# Patient Record
Sex: MALE | Race: BLACK OR AFRICAN AMERICAN | HISPANIC OR LATINO | Employment: FULL TIME | URBAN - METROPOLITAN AREA
[De-identification: names, ages, dates, MRNs, and addresses within clinical notes are randomized per-mention and may not be internally consistent; named-entity substitution may affect disease eponyms.]

---

## 2024-05-21 ENCOUNTER — OFFICE VISIT (OUTPATIENT)
Dept: PRIMARY CARE | Facility: CLINIC | Age: 44
End: 2024-05-21
Payer: COMMERCIAL

## 2024-05-21 ENCOUNTER — LAB (OUTPATIENT)
Dept: LAB | Facility: LAB | Age: 44
End: 2024-05-21
Payer: COMMERCIAL

## 2024-05-21 VITALS
WEIGHT: 315 LBS | BODY MASS INDEX: 40.43 KG/M2 | SYSTOLIC BLOOD PRESSURE: 174 MMHG | TEMPERATURE: 97.1 F | HEIGHT: 74 IN | DIASTOLIC BLOOD PRESSURE: 100 MMHG | HEART RATE: 84 BPM | RESPIRATION RATE: 18 BRPM | OXYGEN SATURATION: 94 %

## 2024-05-21 DIAGNOSIS — Z11.3 ROUTINE SCREENING FOR STI (SEXUALLY TRANSMITTED INFECTION): ICD-10-CM

## 2024-05-21 DIAGNOSIS — I10 PRIMARY HYPERTENSION: ICD-10-CM

## 2024-05-21 DIAGNOSIS — Z00.00 ANNUAL PHYSICAL EXAM: ICD-10-CM

## 2024-05-21 DIAGNOSIS — Z90.01 HISTORY OF EYE REMOVAL: ICD-10-CM

## 2024-05-21 DIAGNOSIS — E66.01 MORBID OBESITY WITH BODY MASS INDEX (BMI) OF 40.0 OR HIGHER (MULTI): ICD-10-CM

## 2024-05-21 DIAGNOSIS — R06.01 ORTHOPNEA: ICD-10-CM

## 2024-05-21 DIAGNOSIS — Z00.00 ANNUAL PHYSICAL EXAM: Primary | ICD-10-CM

## 2024-05-21 DIAGNOSIS — R32 URINARY INCONTINENCE, UNSPECIFIED TYPE: ICD-10-CM

## 2024-05-21 DIAGNOSIS — F17.210 CIGARETTE NICOTINE DEPENDENCE WITHOUT COMPLICATION: ICD-10-CM

## 2024-05-21 DIAGNOSIS — F41.9 ANXIETY: ICD-10-CM

## 2024-05-21 LAB
25(OH)D3 SERPL-MCNC: <7 NG/ML (ref 30–100)
ALBUMIN SERPL BCP-MCNC: 4.4 G/DL (ref 3.4–5)
ALP SERPL-CCNC: 48 U/L (ref 33–120)
ALT SERPL W P-5'-P-CCNC: 30 U/L (ref 10–52)
AMORPH CRY #/AREA UR COMP ASSIST: ABNORMAL /HPF
ANION GAP SERPL CALC-SCNC: 11 MMOL/L (ref 10–20)
APPEARANCE UR: ABNORMAL
AST SERPL W P-5'-P-CCNC: 17 U/L (ref 9–39)
BACTERIA #/AREA URNS AUTO: ABNORMAL /HPF
BILIRUB SERPL-MCNC: 0.6 MG/DL (ref 0–1.2)
BILIRUB UR STRIP.AUTO-MCNC: NEGATIVE MG/DL
BUN SERPL-MCNC: 17 MG/DL (ref 6–23)
CALCIUM SERPL-MCNC: 9 MG/DL (ref 8.6–10.3)
CHLORIDE SERPL-SCNC: 106 MMOL/L (ref 98–107)
CHOLEST SERPL-MCNC: 189 MG/DL (ref 0–199)
CHOLESTEROL/HDL RATIO: 5.1
CO2 SERPL-SCNC: 27 MMOL/L (ref 21–32)
COLOR UR: YELLOW
CREAT SERPL-MCNC: 1.05 MG/DL (ref 0.5–1.3)
CREAT UR-MCNC: 279.6 MG/DL (ref 20–370)
EGFRCR SERPLBLD CKD-EPI 2021: 90 ML/MIN/1.73M*2
ERYTHROCYTE [DISTWIDTH] IN BLOOD BY AUTOMATED COUNT: 18.1 % (ref 11.5–14.5)
EST. AVERAGE GLUCOSE BLD GHB EST-MCNC: 134 MG/DL
GLUCOSE SERPL-MCNC: 93 MG/DL (ref 74–99)
GLUCOSE UR STRIP.AUTO-MCNC: NEGATIVE MG/DL
HBA1C MFR BLD: 6.3 %
HBV SURFACE AG SERPL QL IA: NONREACTIVE
HCT VFR BLD AUTO: 50.6 % (ref 41–52)
HCV AB SER QL: NONREACTIVE
HDLC SERPL-MCNC: 37.4 MG/DL
HGB BLD-MCNC: 16.1 G/DL (ref 13.5–17.5)
HIV 1+2 AB+HIV1 P24 AG SERPL QL IA: NONREACTIVE
KETONES UR STRIP.AUTO-MCNC: NEGATIVE MG/DL
LDLC SERPL CALC-MCNC: 132 MG/DL
LEUKOCYTE ESTERASE UR QL STRIP.AUTO: ABNORMAL
MCH RBC QN AUTO: 23.5 PG (ref 26–34)
MCHC RBC AUTO-ENTMCNC: 31.8 G/DL (ref 32–36)
MCV RBC AUTO: 74 FL (ref 80–100)
MICROALBUMIN UR-MCNC: 417.3 MG/L
MICROALBUMIN/CREAT UR: 149.2 UG/MG CREAT
MUCOUS THREADS #/AREA URNS AUTO: ABNORMAL /LPF
NITRITE UR QL STRIP.AUTO: NEGATIVE
NON HDL CHOLESTEROL: 152 MG/DL (ref 0–149)
NRBC BLD-RTO: 0 /100 WBCS (ref 0–0)
PH UR STRIP.AUTO: 5 [PH]
PLATELET # BLD AUTO: 247 X10*3/UL (ref 150–450)
POTASSIUM SERPL-SCNC: 4.8 MMOL/L (ref 3.5–5.3)
PROT SERPL-MCNC: 7.2 G/DL (ref 6.4–8.2)
PROT UR STRIP.AUTO-MCNC: ABNORMAL MG/DL
PSA SERPL-MCNC: 2.01 NG/ML
RBC # BLD AUTO: 6.85 X10*6/UL (ref 4.5–5.9)
RBC # UR STRIP.AUTO: NEGATIVE /UL
RBC #/AREA URNS AUTO: ABNORMAL /HPF
SODIUM SERPL-SCNC: 139 MMOL/L (ref 136–145)
SP GR UR STRIP.AUTO: 1.03
TRIGL SERPL-MCNC: 99 MG/DL (ref 0–149)
TSH SERPL-ACNC: 1.66 MIU/L (ref 0.44–3.98)
UROBILINOGEN UR STRIP.AUTO-MCNC: 2 MG/DL
VIT B12 SERPL-MCNC: 312 PG/ML (ref 211–911)
VLDL: 20 MG/DL (ref 0–40)
WBC # BLD AUTO: 5.9 X10*3/UL (ref 4.4–11.3)
WBC #/AREA URNS AUTO: ABNORMAL /HPF

## 2024-05-21 PROCEDURE — 84153 ASSAY OF PSA TOTAL: CPT

## 2024-05-21 PROCEDURE — 82570 ASSAY OF URINE CREATININE: CPT

## 2024-05-21 PROCEDURE — 3077F SYST BP >= 140 MM HG: CPT

## 2024-05-21 PROCEDURE — 86803 HEPATITIS C AB TEST: CPT

## 2024-05-21 PROCEDURE — 83036 HEMOGLOBIN GLYCOSYLATED A1C: CPT

## 2024-05-21 PROCEDURE — 87661 TRICHOMONAS VAGINALIS AMPLIF: CPT

## 2024-05-21 PROCEDURE — 87491 CHLMYD TRACH DNA AMP PROBE: CPT

## 2024-05-21 PROCEDURE — 87389 HIV-1 AG W/HIV-1&-2 AB AG IA: CPT

## 2024-05-21 PROCEDURE — 85027 COMPLETE CBC AUTOMATED: CPT

## 2024-05-21 PROCEDURE — 82043 UR ALBUMIN QUANTITATIVE: CPT

## 2024-05-21 PROCEDURE — 82607 VITAMIN B-12: CPT

## 2024-05-21 PROCEDURE — 90715 TDAP VACCINE 7 YRS/> IM: CPT

## 2024-05-21 PROCEDURE — 36415 COLL VENOUS BLD VENIPUNCTURE: CPT

## 2024-05-21 PROCEDURE — 90471 IMMUNIZATION ADMIN: CPT

## 2024-05-21 PROCEDURE — 87340 HEPATITIS B SURFACE AG IA: CPT

## 2024-05-21 PROCEDURE — 87591 N.GONORRHOEAE DNA AMP PROB: CPT

## 2024-05-21 PROCEDURE — 84443 ASSAY THYROID STIM HORMONE: CPT

## 2024-05-21 PROCEDURE — 99214 OFFICE O/P EST MOD 30 MIN: CPT

## 2024-05-21 PROCEDURE — 81001 URINALYSIS AUTO W/SCOPE: CPT

## 2024-05-21 PROCEDURE — 86780 TREPONEMA PALLIDUM: CPT

## 2024-05-21 PROCEDURE — 3080F DIAST BP >= 90 MM HG: CPT

## 2024-05-21 PROCEDURE — 99396 PREV VISIT EST AGE 40-64: CPT

## 2024-05-21 PROCEDURE — 82306 VITAMIN D 25 HYDROXY: CPT

## 2024-05-21 PROCEDURE — 80053 COMPREHEN METABOLIC PANEL: CPT

## 2024-05-21 PROCEDURE — 80061 LIPID PANEL: CPT

## 2024-05-21 RX ORDER — DM/P-EPHED/ACETAMINOPH/DOXYLAM 30-7.5/3
2 LIQUID (ML) ORAL EVERY 2 HOUR PRN
Qty: 100 LOZENGE | Refills: 1 | Status: SHIPPED | OUTPATIENT
Start: 2024-05-21 | End: 2024-06-20

## 2024-05-21 RX ORDER — AMLODIPINE BESYLATE 10 MG/1
10 TABLET ORAL DAILY
Qty: 90 TABLET | Refills: 3 | Status: SHIPPED | OUTPATIENT
Start: 2024-05-21

## 2024-05-21 RX ORDER — AMLODIPINE BESYLATE 10 MG/1
1 TABLET ORAL DAILY
COMMUNITY
End: 2024-05-21 | Stop reason: SDUPTHER

## 2024-05-21 RX ORDER — IBUPROFEN 200 MG
1 TABLET ORAL EVERY 24 HOURS
Qty: 30 PATCH | Refills: 1 | Status: SHIPPED | OUTPATIENT
Start: 2024-05-21 | End: 2024-07-20

## 2024-05-21 SDOH — ECONOMIC STABILITY: FOOD INSECURITY: WITHIN THE PAST 12 MONTHS, YOU WORRIED THAT YOUR FOOD WOULD RUN OUT BEFORE YOU GOT MONEY TO BUY MORE.: NEVER TRUE

## 2024-05-21 SDOH — ECONOMIC STABILITY: FOOD INSECURITY: WITHIN THE PAST 12 MONTHS, THE FOOD YOU BOUGHT JUST DIDN'T LAST AND YOU DIDN'T HAVE MONEY TO GET MORE.: NEVER TRUE

## 2024-05-21 SDOH — ECONOMIC STABILITY: GENERAL
WHICH OF THE FOLLOWING WOULD YOU LIKE TO GET CONNECTED TO IN ORDER TO RECEIVE A DISCOUNT OR FOR FREE? (CHOOSE ALL THAT APPLY): DIGITAL/INTERNET SKILLS TRAINING

## 2024-05-21 SDOH — ECONOMIC STABILITY: HOUSING INSECURITY
IN THE LAST 12 MONTHS, WAS THERE A TIME WHEN YOU DID NOT HAVE A STEADY PLACE TO SLEEP OR SLEPT IN A SHELTER (INCLUDING NOW)?: NO

## 2024-05-21 SDOH — ECONOMIC STABILITY: GENERAL
WHICH OF THE FOLLOWING DO YOU KNOW HOW TO USE AND HAVE ACCESS TO EVERY DAY? (CHOOSE ALL THAT APPLY): SMARTPHONE WITH CELLULAR DATA PLAN

## 2024-05-21 SDOH — ECONOMIC STABILITY: INCOME INSECURITY: IN THE LAST 12 MONTHS, WAS THERE A TIME WHEN YOU WERE NOT ABLE TO PAY THE MORTGAGE OR RENT ON TIME?: NO

## 2024-05-21 SDOH — ECONOMIC STABILITY: TRANSPORTATION INSECURITY
IN THE PAST 12 MONTHS, HAS LACK OF TRANSPORTATION KEPT YOU FROM MEETINGS, WORK, OR FROM GETTING THINGS NEEDED FOR DAILY LIVING?: NO

## 2024-05-21 SDOH — HEALTH STABILITY: PHYSICAL HEALTH: ON AVERAGE, HOW MANY DAYS PER WEEK DO YOU ENGAGE IN MODERATE TO STRENUOUS EXERCISE (LIKE A BRISK WALK)?: 7 DAYS

## 2024-05-21 SDOH — ECONOMIC STABILITY: TRANSPORTATION INSECURITY
IN THE PAST 12 MONTHS, HAS THE LACK OF TRANSPORTATION KEPT YOU FROM MEDICAL APPOINTMENTS OR FROM GETTING MEDICATIONS?: NO

## 2024-05-21 SDOH — HEALTH STABILITY: PHYSICAL HEALTH: ON AVERAGE, HOW MANY MINUTES DO YOU ENGAGE IN EXERCISE AT THIS LEVEL?: 120 MIN

## 2024-05-21 ASSESSMENT — ENCOUNTER SYMPTOMS
DEPRESSION: 0
OCCASIONAL FEELINGS OF UNSTEADINESS: 0
LOSS OF SENSATION IN FEET: 0

## 2024-05-21 ASSESSMENT — SOCIAL DETERMINANTS OF HEALTH (SDOH)
HOW OFTEN DO YOU ATTEND CHURCH OR RELIGIOUS SERVICES?: NEVER
HOW OFTEN DO YOU GET TOGETHER WITH FRIENDS OR RELATIVES?: NEVER
WITHIN THE LAST YEAR, HAVE YOU BEEN AFRAID OF YOUR PARTNER OR EX-PARTNER?: NO
ARE YOU MARRIED, WIDOWED, DIVORCED, SEPARATED, NEVER MARRIED, OR LIVING WITH A PARTNER?: LIVING WITH PARTNER
IN THE PAST 12 MONTHS, HAS THE ELECTRIC, GAS, OIL, OR WATER COMPANY THREATENED TO SHUT OFF SERVICE IN YOUR HOME?: NO
WITHIN THE LAST YEAR, HAVE YOU BEEN HUMILIATED OR EMOTIONALLY ABUSED IN OTHER WAYS BY YOUR PARTNER OR EX-PARTNER?: NO
WITHIN THE LAST YEAR, HAVE YOU BEEN KICKED, HIT, SLAPPED, OR OTHERWISE PHYSICALLY HURT BY YOUR PARTNER OR EX-PARTNER?: NO
WITHIN THE LAST YEAR, HAVE TO BEEN RAPED OR FORCED TO HAVE ANY KIND OF SEXUAL ACTIVITY BY YOUR PARTNER OR EX-PARTNER?: NO
HOW OFTEN DO YOU ATTENT MEETINGS OF THE CLUB OR ORGANIZATION YOU BELONG TO?: MORE THAN 4 TIMES PER YEAR
DO YOU BELONG TO ANY CLUBS OR ORGANIZATIONS SUCH AS CHURCH GROUPS UNIONS, FRATERNAL OR ATHLETIC GROUPS, OR SCHOOL GROUPS?: YES
HOW HARD IS IT FOR YOU TO PAY FOR THE VERY BASICS LIKE FOOD, HOUSING, MEDICAL CARE, AND HEATING?: NOT HARD AT ALL
IN A TYPICAL WEEK, HOW MANY TIMES DO YOU TALK ON THE PHONE WITH FAMILY, FRIENDS, OR NEIGHBORS?: MORE THAN THREE TIMES A WEEK

## 2024-05-21 ASSESSMENT — PATIENT HEALTH QUESTIONNAIRE - PHQ9
2. FEELING DOWN, DEPRESSED OR HOPELESS: NOT AT ALL
SUM OF ALL RESPONSES TO PHQ9 QUESTIONS 1 & 2: 0
1. LITTLE INTEREST OR PLEASURE IN DOING THINGS: NOT AT ALL

## 2024-05-21 ASSESSMENT — LIFESTYLE VARIABLES
HOW OFTEN DO YOU HAVE A DRINK CONTAINING ALCOHOL: MONTHLY OR LESS
HOW MANY STANDARD DRINKS CONTAINING ALCOHOL DO YOU HAVE ON A TYPICAL DAY: 1 OR 2

## 2024-05-21 NOTE — ASSESSMENT & PLAN NOTE
Patient is not interested in trying talk therapy or medication for his anxiety symptoms.  I did explain other ways of managing his anxiety such as exercise, breathing techniques, meditation, etc.

## 2024-05-21 NOTE — ASSESSMENT & PLAN NOTE
Patient reports relatively recent onset orthopnea.  He also has trace edema in bilateral lower extremities. Heart and lung exams were normal today. He denies chest pain.  No personal history of heart failure however he has not had a full medical workup in many years.  He does have a strong family history of heart failure.  I will obtain an echocardiogram to assess the function of his heart.

## 2024-05-21 NOTE — PATIENT INSTRUCTIONS
Buy a blood pressure cuff and check BP a few times a week and write them down and bring them with you to your next appt

## 2024-05-21 NOTE — ASSESSMENT & PLAN NOTE
Will check urinalysis for signs of infection as well as full panel of sexually transmitted infections for a cause of his recent urinary incontinence episodes.  Patient reports a strong urinary stream so I do not think that prostate issues are contributing to this issue however will obtain a PSA.  Patient denies any other symptoms of cauda equina syndrome.
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
## 2024-05-21 NOTE — ASSESSMENT & PLAN NOTE
Patient has decreased his cigarette use from 2 packs a day to 1 pack a day.  He is interested in cutting back or quitting.  He has never tried nicotine replacement therapy.  We will try nicotine patches and lozenges.  Patient instructed not to smoke on these.  Follow-up in 1 month.

## 2024-05-21 NOTE — ASSESSMENT & PLAN NOTE
Blood pressure was elevated in the office at 174/100 today.  He has been out of amlodipine 10 mg for 1 year.  I have refilled this today.  Patient is to buy a blood pressure cuff and start checking at home and follow-up in 1 month.  ED precautions given.

## 2024-05-22 LAB
C TRACH RRNA SPEC QL NAA+PROBE: NEGATIVE
N GONORRHOEA DNA SPEC QL PROBE+SIG AMP: NEGATIVE
T VAGINALIS RRNA SPEC QL NAA+PROBE: POSITIVE
TREPONEMA PALLIDUM IGG+IGM AB [PRESENCE] IN SERUM OR PLASMA BY IMMUNOASSAY: NONREACTIVE

## 2024-05-23 DIAGNOSIS — E66.01 MORBID OBESITY WITH BODY MASS INDEX (BMI) OF 40.0 OR HIGHER (MULTI): ICD-10-CM

## 2024-05-23 DIAGNOSIS — R80.9 PROTEINURIA, UNSPECIFIED TYPE: ICD-10-CM

## 2024-05-23 DIAGNOSIS — A59.9 INFECTION DUE TO TRICHOMONAS: ICD-10-CM

## 2024-05-23 DIAGNOSIS — E55.9 VITAMIN D DEFICIENCY: ICD-10-CM

## 2024-05-23 DIAGNOSIS — R73.03 PRE-DIABETES: Primary | ICD-10-CM

## 2024-05-23 DIAGNOSIS — E78.5 HYPERLIPIDEMIA, UNSPECIFIED HYPERLIPIDEMIA TYPE: ICD-10-CM

## 2024-05-23 RX ORDER — METRONIDAZOLE 500 MG/1
2000 TABLET ORAL ONCE
Qty: 4 TABLET | Refills: 0 | Status: SHIPPED | OUTPATIENT
Start: 2024-05-23 | End: 2024-05-23

## 2024-05-23 RX ORDER — ERGOCALCIFEROL 1.25 MG/1
50000 CAPSULE ORAL
Qty: 13 CAPSULE | Refills: 3 | Status: SHIPPED | OUTPATIENT
Start: 2024-05-26 | End: 2025-05-25

## 2024-05-23 RX ORDER — ROSUVASTATIN CALCIUM 20 MG/1
20 TABLET, COATED ORAL DAILY
Qty: 90 TABLET | Refills: 3 | Status: SHIPPED | OUTPATIENT
Start: 2024-05-23 | End: 2025-05-18

## 2024-05-23 NOTE — PROGRESS NOTES
I saw and evaluated the patient. I personally obtained the key and critical portions of the history and physical exam or was physically present for key and critical portions performed by the resident/fellow. I reviewed the resident/fellow's documentation and discussed the patient with the resident/fellow. I agree with the resident/fellow's medical decision making as documented in the note.    Armando Rey, DO

## 2024-06-26 ENCOUNTER — HOSPITAL ENCOUNTER (OUTPATIENT)
Dept: CARDIOLOGY | Facility: CLINIC | Age: 44
Discharge: HOME | End: 2024-06-26
Payer: COMMERCIAL

## 2024-06-26 VITALS — HEIGHT: 74 IN | WEIGHT: 315 LBS | BODY MASS INDEX: 40.43 KG/M2

## 2024-06-26 DIAGNOSIS — R06.01 ORTHOPNEA: ICD-10-CM

## 2024-06-26 LAB
AORTIC VALVE MEAN GRADIENT: 7 MMHG
AORTIC VALVE PEAK VELOCITY: 1.65 M/S
AV PEAK GRADIENT: 10.9 MMHG
AVA (PEAK VEL): 2.33 CM2
AVA (VTI): 3.21 CM2
EJECTION FRACTION APICAL 4 CHAMBER: 56.8
EJECTION FRACTION: 60 %
LEFT VENTRICLE INTERNAL DIMENSION DIASTOLE: 4.7 CM (ref 3.5–6)
LEFT VENTRICULAR OUTFLOW TRACT DIAMETER: 2.2 CM
MITRAL VALVE E/A RATIO: 0.78
RIGHT VENTRICLE PEAK SYSTOLIC PRESSURE: 12.4 MMHG

## 2024-06-26 PROCEDURE — 93306 TTE W/DOPPLER COMPLETE: CPT | Performed by: INTERNAL MEDICINE

## 2024-06-26 PROCEDURE — 93306 TTE W/DOPPLER COMPLETE: CPT

## 2024-06-28 DIAGNOSIS — Q24.8 LEFT VENTRICULAR OUTFLOW TRACT OBSTRUCTION (HHS-HCC): Primary | ICD-10-CM

## 2024-06-28 DIAGNOSIS — I51.89 DIASTOLIC DYSFUNCTION: ICD-10-CM

## 2024-06-28 DIAGNOSIS — I10 PRIMARY HYPERTENSION: ICD-10-CM

## 2024-06-28 DIAGNOSIS — R06.01 ORTHOPNEA: ICD-10-CM

## 2024-08-12 ENCOUNTER — OFFICE VISIT (OUTPATIENT)
Dept: CARDIOLOGY | Facility: CLINIC | Age: 44
End: 2024-08-12
Payer: COMMERCIAL

## 2024-08-12 ENCOUNTER — APPOINTMENT (OUTPATIENT)
Dept: CARDIOLOGY | Facility: CLINIC | Age: 44
End: 2024-08-12
Payer: COMMERCIAL

## 2024-08-12 VITALS
BODY MASS INDEX: 40.43 KG/M2 | WEIGHT: 315 LBS | SYSTOLIC BLOOD PRESSURE: 160 MMHG | HEIGHT: 74 IN | DIASTOLIC BLOOD PRESSURE: 96 MMHG | HEART RATE: 90 BPM

## 2024-08-12 DIAGNOSIS — I51.89 DIASTOLIC DYSFUNCTION: ICD-10-CM

## 2024-08-12 DIAGNOSIS — Q24.8 LEFT VENTRICULAR OUTFLOW TRACT OBSTRUCTION (HHS-HCC): ICD-10-CM

## 2024-08-12 DIAGNOSIS — R06.01 ORTHOPNEA: ICD-10-CM

## 2024-08-12 DIAGNOSIS — R94.31 ABNORMAL EKG: ICD-10-CM

## 2024-08-12 DIAGNOSIS — I10 PRIMARY HYPERTENSION: ICD-10-CM

## 2024-08-12 PROCEDURE — 3077F SYST BP >= 140 MM HG: CPT | Performed by: INTERNAL MEDICINE

## 2024-08-12 PROCEDURE — 3080F DIAST BP >= 90 MM HG: CPT | Performed by: INTERNAL MEDICINE

## 2024-08-12 PROCEDURE — 3008F BODY MASS INDEX DOCD: CPT | Performed by: INTERNAL MEDICINE

## 2024-08-12 PROCEDURE — 99204 OFFICE O/P NEW MOD 45 MIN: CPT | Performed by: INTERNAL MEDICINE

## 2024-08-12 RX ORDER — METOPROLOL TARTRATE 50 MG/1
50 TABLET ORAL 2 TIMES DAILY
Qty: 180 TABLET | Refills: 3 | Status: SHIPPED | OUTPATIENT
Start: 2024-08-12 | End: 2025-08-12

## 2024-08-12 NOTE — PATIENT INSTRUCTIONS
Continue same medications and treatments.   Patient educated on proper medication use.   Patient educated on risk factor modification.   Please bring any lab results from other providers / physicians to your next appointment.     Please bring all medicines, vitamins, and herbal supplements with you when you come to the office.     Prescriptions will not be filled unless you are compliant with your follow up appointments or have a follow up appointment scheduled as per instruction of your physician. Refills should be requested at the time of your visit.     Follow up after testing  2 DAY MPX STRESS TEST  START METOPROLOL 50MG DAILY

## 2024-08-12 NOTE — PROGRESS NOTES
Primary care  CARDIOLOGY NEW PATIENT OFFICE VISIT    Patient:  Gurvinder Villagomez  YOB: 1980  MRN: [id number]       Chief Complaint/Active Symptoms:       Gurvinder Villagomez is a 44 y.o. male who is being seen today at the request of primary care physician for evaluation of abnormal echo, hypertension, abnormal EKG.    Chief Complaint   Patient presents with    Follow-up     Echo results.     Is a 44-year-old gentleman no known cardiac disease but hypertension borderline diabetes and dyslipidemia.  He is morbidly obese.  He has no active symptomatology.  He apparently had an echo few months ago which showed LVH and minor degree of outflow tract obstruction.  This is likely predicated on the patient's size which he however is over 400 pounds regularly.  He is again not having any chest pain or shortness of breath.  He is working several jobs at this time.  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  Again echocardiogram which I had interpreted back in May does show LVH hyperdynamic LV function and mild outflow tract obstruction with Valsalva.    EKG today shows sinus rhythm with possible septal infarct remote left axis deviation.  Patient again claims no cardiac history no heart attack heart failure or arrhythmia syncope or near syncope.    Past medical history includes borderline diabetes obesity hypertension and hyperlipidemia.  He denies any other major GI  renal otologic urological endocrine hematologic vascular or pulmonary abnormalities.  Social history denies any alcohol or drug use at this time.  States he may have smoked some marijuana in the past but no heart drug use.  No cigarette smoking.  He is employed at this time.    Family history includes mother having congenital cardiac problem with possible ASD.    Meds are as listed.    Medical allergies none.    Systems review is  otherwise noncontributory.  History of Present Illness:       Allergies:     No Known Allergies     Outpatient Medications:     Current Outpatient Medications   Medication Instructions    amLODIPine (NORVASC) 10 mg, oral, Daily    empagliflozin (JARDIANCE) 10 mg, oral, Daily    ergocalciferol (VITAMIN D-2) 50,000 Units, oral, Once Weekly    metoprolol tartrate (LOPRESSOR) 50 mg, oral, 2 times daily    rosuvastatin (CRESTOR) 20 mg, oral, Daily        Past Medical History:     History reviewed. No pertinent past medical history.    Social History:     Social History     Tobacco Use    Smoking status: Every Day     Types: Cigarettes   Substance Use Topics    Alcohol use: Yes    Drug use: Never       Family History:   No family history on file.    Review of Systems:     12 point review of systems completed and is negative other than that detailed above.     Objective:     Vitals:    08/12/24 1524   BP: (!) 160/96   Pulse: 90       Wt Readings from Last 4 Encounters:   08/12/24 (!) 185 kg (408 lb 9.6 oz)   06/26/24 (!) 190 kg (419 lb)   05/21/24 (!) 190 kg (419 lb 6 oz)   08/12/21 (!) 179 kg (394 lb 6 oz)       Physical Examination:   GENERAL:  Well developed, well nourished, in no acute distress.  CHEST:  Symmetric and nontender.  NEURO/PSYCH:  Alert and oriented times three with approppriate behavior and responses.  NECK:  Supple, no JVD, no bruit.  LUNGS:  Clear to auscultation bilaterally, normal respiratory effort.  HEART:  Rate and rhythm regular with no evident murmur, no gallop appreciated.        There are no rubs, clicks or heaves.  EXTREMITIES:  Warm with good color, no clubbing or cyanosis.  There is no edema noted.  PERIPHERAL VASCULAR:  Pulses present and equally palpable; 2+ throughout.      Lab:     CBC:   Lab Results   Component Value Date    WBC 5.9 05/21/2024    RBC 6.85 (H) 05/21/2024    HGB 16.1 05/21/2024    HCT 50.6 05/21/2024     05/21/2024        CMP:    Lab Results   Component Value Date  "    05/21/2024    K 4.8 05/21/2024     05/21/2024    CO2 27 05/21/2024    BUN 17 05/21/2024    CREATININE 1.05 05/21/2024    GLUCOSE 93 05/21/2024    CALCIUM 9.0 05/21/2024       Magnesium:    No results found for: \"MG\"    Lipid Profile:    Lab Results   Component Value Date    TRIG 99 05/21/2024    HDL 37.4 05/21/2024    LDLCALC 132 (H) 05/21/2024       TSH:    Lab Results   Component Value Date    TSH 1.66 05/21/2024       BNP:   No results found for: \"BNP\"     PT/INR:    No results found for: \"PROTIME\", \"INR\"    HgBA1c:    Lab Results   Component Value Date    HGBA1C 6.3 (H) 05/21/2024       BMP:  Lab Results   Component Value Date     05/21/2024    K 4.8 05/21/2024     05/21/2024    CO2 27 05/21/2024    BUN 17 05/21/2024    CREATININE 1.05 05/21/2024       CBC:  Lab Results   Component Value Date    WBC 5.9 05/21/2024    RBC 6.85 (H) 05/21/2024    HGB 16.1 05/21/2024    HCT 50.6 05/21/2024    MCV 74 (L) 05/21/2024    MCH 23.5 (L) 05/21/2024    MCHC 31.8 (L) 05/21/2024    RDW 18.1 (H) 05/21/2024     05/21/2024       Cardiac Enzymes:    No results found for: \"TROPHS\"    Hepatic Function Panel:    Lab Results   Component Value Date    ALKPHOS 48 05/21/2024    ALT 30 05/21/2024    AST 17 05/21/2024    PROT 7.2 05/21/2024    BILITOT 0.6 05/21/2024       Diagnostic Studies:    [facility] [address], [address], [place] [address]           Tel [phone] Fax [phone]     TRANSTHORACIC ECHOCARDIOGRAM REPORT     Patient Name:      WALT Palmer Physician:    kAil Talavera DO  Study Date:        6/26/2024            Ordering Provider:    [id number] TED GARNICA  MRN/PID:           [id number]             Fellow:  Accession#:        [id number]         Nurse:  Date of Birth/Age: 1980 / 44 years Sonographer:          Roger Gonzalez  Gender:            M                    " Additional Staff:  Height:            187.96 cm            Admit Date:           6/26/2024  Weight:            190.06 kg            Admission Status:     Outpatient  BSA / BMI:         2.98 m2 / 53.80      Department Location:  [facility]                     kg/m2                                      Chinedu Hunt  Blood Pressure: 160 /90 mmHg     Study Type:    TRANSTHORACIC ECHO (TTE) COMPLETE  Diagnosis/ICD: Orthopnea-R06.01  Indication:    Orthopnea  CPT Codes:     Echo Complete w Full [id number]     Patient History:  Pertinent History: HTN and Orthopnea.     Study Detail: The following Echo studies were performed: 2D, M-Mode, Doppler and                color flow. The patient was awake.        PHYSICIAN INTERPRETATION:  Left Ventricle: The left ventricular systolic function is normal, with a visually estimated ejection fraction of 60%. There are no regional wall motion abnormalities. The left ventricular cavity size is normal. There is moderate concentric left ventricular hypertrophy. Spectral Doppler shows an impaired relaxation pattern of left ventricular diastolic filling. There is a mild left ventricular outflow tract obstruction with the Valsalva maneuver. The resting gradient is 2 mmHg. The provoked gradient is 8 mmHg.  LV Wall Scoring:  All segments are normal.     Left Atrium: The left atrium is normal in size.  Right Ventricle: The right ventricle is normal in size. There is normal right ventricular global systolic function.  Right Atrium: The right atrium is normal in size.  Aortic Valve: The aortic valve appears structurally normal. The aortic valve appears tricuspid. There is no evidence of aortic valve stenosis.  The aortic valve dimensionless index is 0.84. There is no evidence of aortic valve regurgitation. The peak instantaneous gradient of the aortic valve is 10.9 mmHg. The mean gradient of the aortic valve is 7.0 mmHg.  Mitral Valve: The mitral valve is normal in structure. There is no  evidence of mitral valve stenosis. The doppler estimated mean and peak diastolic pressure gradients are 2.0 mmHg and 3.3 mmHg respectively. There is normal mitral valve leaflet mobility. There is trace mitral valve regurgitation.  Tricuspid Valve: The tricuspid valve is structurally normal. There is normal tricuspid valve leaflet mobility. There is trace tricuspid regurgitation.  Pulmonic Valve: The pulmonic valve is structurally normal. There is trace pulmonic valve regurgitation.  Pericardium: There is no pericardial effusion noted.  Aorta: The aortic root is normal. There is mild dilatation of the aortic root.  Pulmonary Artery: The main pulmonary artery is normal in size, and position, with normal bifurcation into the left and right pulmonary arteries.  Systemic Veins: The inferior vena cava was not well visualized.  In comparison to the previous echocardiogram(s): There are no prior studies on this patient for comparison purposes.        CONCLUSIONS:   1. The left ventricular systolic function is normal, with a visually estimated ejection fraction of 60%.   2. Spectral Doppler shows an impaired relaxation pattern of left ventricular diastolic filling.   3. There is moderate concentric left ventricular hypertrophy.   4. There is a mild left ventricular outflow tract obstruction with the Valsalva maneuver. The resting gradient is 2 mmHg. The provoked gradient is 8 mmHg.   5. There is normal right ventricular global systolic function.   6. There is no evidence of mitral valve stenosis.   7. Trace mitral valve regurgitation.   8. Trace tricuspid regurgitation is visualized.   9. Aortic valve stenosis is not present.  10. The main pulmonary artery is normal in size, and position, with normal bifurcation into the left and right pulmonary arteries.     RECOMMENDATIONS:  Technically suboptimal and limited study, therefore accuracy of above interpretation could be substantially diminished. Clinical correlation is  advised. Consider additional imaging modalities if clinically indicated.      EKG:  Normal sinus rhythm  Possible anterior wall infarct remote  Left axis deviation  Radiology:     Nuclear Stress Test    (Results Pending)       Problem List:     Patient Active Problem List   Diagnosis    Hypertension    Morbid obesity with body mass index (BMI) of 40.0 or higher (Multi)    Routine screening for STI (sexually transmitted infection)    Annual physical exam    Urinary incontinence    Orthopnea    Cigarette nicotine dependence without complication    History of eye removal    Anxiety       Assessment:         44-year-old gentleman here for cardiovascular evaluation due to abnormal echo and EKG.    Meds, vitals, examination as noted.    Chart was reviewed in detail discussed with the patient at length.    Impression:  Abnormal echocardiogram suggestive of LVH and mild outflow tract obstruction (likely related to patient's size, hypertension, and body habitus)  Poorly controlled hypertension  Morbid obesity  Dyslipidemia  History of borderline diabetes  Former smoker  No eye injury and removal by surgical procedure  Plan:   Recommendation:  For better blood pressure control and LVH issues will start on beta-blocker metoprolol XL 50 daily  Continue amlodipine  Plan to do a perfusion scan to rule out any underlying ischemic disease  Of note echocardiogram does not show wall motion abnormalities consistent with prior infarct  Strongly encourage exercise weight loss program  Patient will see me back upon completion of studies to discuss findings recommendations and adjust medications further for blood pressure management  Patient should be tested by primary care for any underlying diabetes or prediabetic state and/or insulin resistance  Discussed all findings in detail and patient is agreeable to proceed in this fashion

## 2024-12-02 ENCOUNTER — APPOINTMENT (OUTPATIENT)
Dept: PRIMARY CARE | Facility: CLINIC | Age: 44
End: 2024-12-02
Payer: COMMERCIAL

## 2025-07-18 ENCOUNTER — OFFICE VISIT (OUTPATIENT)
Dept: PRIMARY CARE | Facility: CLINIC | Age: 45
End: 2025-07-18
Payer: COMMERCIAL

## 2025-07-18 VITALS
HEART RATE: 77 BPM | WEIGHT: 315 LBS | BODY MASS INDEX: 40.43 KG/M2 | TEMPERATURE: 95 F | OXYGEN SATURATION: 98 % | HEIGHT: 74 IN | SYSTOLIC BLOOD PRESSURE: 126 MMHG | RESPIRATION RATE: 18 BRPM | DIASTOLIC BLOOD PRESSURE: 80 MMHG

## 2025-07-18 DIAGNOSIS — Z90.01 HISTORY OF EYE REMOVAL: ICD-10-CM

## 2025-07-18 DIAGNOSIS — R73.03 PREDIABETES: ICD-10-CM

## 2025-07-18 DIAGNOSIS — Z12.5 PROSTATE CANCER SCREENING: ICD-10-CM

## 2025-07-18 DIAGNOSIS — Z13.89 SCREENING FOR BLOOD OR PROTEIN IN URINE: ICD-10-CM

## 2025-07-18 DIAGNOSIS — Z13.21 ENCOUNTER FOR VITAMIN DEFICIENCY SCREENING: ICD-10-CM

## 2025-07-18 DIAGNOSIS — E78.2 MIXED HYPERLIPIDEMIA: ICD-10-CM

## 2025-07-18 DIAGNOSIS — Z13.220 NEED FOR LIPID SCREENING: ICD-10-CM

## 2025-07-18 DIAGNOSIS — Z13.29 THYROID DISORDER SCREENING: ICD-10-CM

## 2025-07-18 DIAGNOSIS — F17.210 CIGARETTE SMOKER: ICD-10-CM

## 2025-07-18 DIAGNOSIS — I10 PRIMARY HYPERTENSION: Primary | ICD-10-CM

## 2025-07-18 DIAGNOSIS — Z13.228 SCREENING FOR METABOLIC DISORDER: ICD-10-CM

## 2025-07-18 DIAGNOSIS — Z13.1 ENCOUNTER FOR SCREENING EXAMINATION FOR IMPAIRED GLUCOSE REGULATION AND DIABETES MELLITUS: ICD-10-CM

## 2025-07-18 DIAGNOSIS — R80.9 PROTEINURIA, UNSPECIFIED TYPE: ICD-10-CM

## 2025-07-18 DIAGNOSIS — M25.562 PAIN IN BOTH KNEES, UNSPECIFIED CHRONICITY: ICD-10-CM

## 2025-07-18 DIAGNOSIS — Z01.00 EYE EXAM, ROUTINE: ICD-10-CM

## 2025-07-18 DIAGNOSIS — Z92.89 HISTORY OF COMPLETE BLOOD COUNT: ICD-10-CM

## 2025-07-18 DIAGNOSIS — E55.9 VITAMIN D DEFICIENCY: ICD-10-CM

## 2025-07-18 DIAGNOSIS — G47.33 OSA (OBSTRUCTIVE SLEEP APNEA): ICD-10-CM

## 2025-07-18 DIAGNOSIS — M25.561 PAIN IN BOTH KNEES, UNSPECIFIED CHRONICITY: ICD-10-CM

## 2025-07-18 DIAGNOSIS — E66.813 CLASS 3 SEVERE OBESITY DUE TO EXCESS CALORIES WITH SERIOUS COMORBIDITY AND BODY MASS INDEX (BMI) OF 50.0 TO 59.9 IN ADULT: ICD-10-CM

## 2025-07-18 PROBLEM — Z00.00 ANNUAL PHYSICAL EXAM: Status: RESOLVED | Noted: 2024-05-21 | Resolved: 2025-07-18

## 2025-07-18 PROBLEM — R32 URINARY INCONTINENCE: Status: RESOLVED | Noted: 2024-05-21 | Resolved: 2025-07-18

## 2025-07-18 PROBLEM — Z11.3 ROUTINE SCREENING FOR STI (SEXUALLY TRANSMITTED INFECTION): Status: RESOLVED | Noted: 2024-05-21 | Resolved: 2025-07-18

## 2025-07-18 PROBLEM — F41.9 ANXIETY: Status: RESOLVED | Noted: 2024-05-21 | Resolved: 2025-07-18

## 2025-07-18 PROBLEM — R06.01 ORTHOPNEA: Status: RESOLVED | Noted: 2024-05-21 | Resolved: 2025-07-18

## 2025-07-18 PROCEDURE — 3008F BODY MASS INDEX DOCD: CPT

## 2025-07-18 PROCEDURE — 3074F SYST BP LT 130 MM HG: CPT

## 2025-07-18 PROCEDURE — 3079F DIAST BP 80-89 MM HG: CPT

## 2025-07-18 PROCEDURE — 99214 OFFICE O/P EST MOD 30 MIN: CPT

## 2025-07-18 RX ORDER — BISMUTH SUBSALICYLATE 262 MG
1 TABLET,CHEWABLE ORAL DAILY
COMMUNITY

## 2025-07-18 RX ORDER — MELOXICAM 15 MG/1
15 TABLET ORAL DAILY PRN
Qty: 90 TABLET | Refills: 3 | Status: SHIPPED | OUTPATIENT
Start: 2025-07-18

## 2025-07-18 RX ORDER — ROSUVASTATIN CALCIUM 20 MG/1
20 TABLET, COATED ORAL DAILY
COMMUNITY

## 2025-07-18 RX ORDER — METOPROLOL TARTRATE 50 MG/1
50 TABLET ORAL 2 TIMES DAILY
Qty: 180 TABLET | Refills: 3 | Status: SHIPPED | OUTPATIENT
Start: 2025-07-18 | End: 2026-07-18

## 2025-07-18 RX ORDER — AMLODIPINE BESYLATE 10 MG/1
10 TABLET ORAL DAILY
Qty: 90 TABLET | Refills: 3 | Status: SHIPPED | OUTPATIENT
Start: 2025-07-18

## 2025-07-18 RX ORDER — ERGOCALCIFEROL 1.25 MG/1
50000 CAPSULE ORAL WEEKLY
Qty: 13 CAPSULE | Refills: 3 | Status: SHIPPED | OUTPATIENT
Start: 2025-07-18 | End: 2026-07-17

## 2025-07-18 ASSESSMENT — PATIENT HEALTH QUESTIONNAIRE - PHQ9
SUM OF ALL RESPONSES TO PHQ9 QUESTIONS 1 AND 2: 0
2. FEELING DOWN, DEPRESSED OR HOPELESS: NOT AT ALL
1. LITTLE INTEREST OR PLEASURE IN DOING THINGS: NOT AT ALL

## 2025-07-18 NOTE — PATIENT INSTRUCTIONS
Fasting bloodwork   Let me know if zepbound is not covered   Try meloxicam for the knee pain  Schedule with the ophthalmologist  Eat higher vit D food     Treatment for anxiety & depression without prescription medication:  - Speak with a talk therapist/counselor to work through your emotions and come up with healthy coping mechanisms. Alternatively you could try journaling to help work through your worries and your gratitude. Reach out to loved ones for social support.  - Supplements: magnesium glycinate (200-400 mg/day), ashwagandha (300-600 mg/day), L-theanine (100-200 mg/day), omega-3 fatty acids (1,000-2,000 mg/day of EPA/DHA), 5-HTP ( mg/day), VITO-e (200-1,200 mg/day), TORRES (100-500 mg/day), valerian root (300-600 mg/day), probiotics with lactobacillus or bifidobacterium, vitamin D (2,000-5,000 iu/day), vitamin B12 (1,000-2,000 mcg/day)  - Exercise releases endorphins and lowers cortisol levels. Don’t love to exercise? Try going for a walk and listen to music, podcasts, audiobooks, etc.  - Prioritize 7-9 hours of sleep each night. Work on good sleep hygiene. Avoid screens directly before bed (blue light is overstimulating). Stop eating/drinking 1+ hour before bed. Read or take a hot bath before bed to wind down.  - Practice mindfulness or try guided meditations (YouTube, Podcasts, Calm carolyn, Headspace carolyn)  - Engage in grounding techniques to manage panic (“5-4-3-2-1 method,” box breathing, hold an ice cube, splash cold water on your face, suck on a warhead or other sour candy)  - Yoga, which combines movement, breath, and meditation (in person classes, YouTube tutorials)  - Stabilize your blood sugar to help regulate your mood by eating three meals a day and focusing on protein, healthy fats, and fiber instead of simple sugars which spike and crash the blood sugar  - Limit caffeine, alcohol, and nicotine. All of these can exacerbate anxiety and depression symptoms. Opt for decaf coffee or herbal tea  (chamomile, peppermint, valerian root, lemon balm, lavender) when possible.  - Spend time outdoors  - Get a massage or acupuncture   - Use aromatherapy with essential oils in a diffuser (try lavender, chamomile, chris, bergamot, frankincense, patchouli, ylang ylang)  - Consider taking a break from social media and/or news outlets, or set time limits for your devices  - Need more help? See your doctor to discuss prescription medication options  - In crisis? Call or text the National Suicide Prevention Lifeline at 988, call emergency services at 911, or present to the emergency department     GLP1 Medication Lifestyle Tips:    Diet  - High-protein meals support muscle mass and satiety (lean meat, eggs, Greek yogurt, legumes).  - Non-starchy vegetables are fiber-rich, low-calorie, high volume foods (broccoli, spinach, zucchini).  - Limit greasy, high-fat meals.They can worsen nausea.  - Avoid carbonated drinks. They increase bloating.  - Smaller portions, slower eating. Satiety kicks in faster on GLP1's.  - Aim for 64+ oz water/day to help digestion and reduce constipation.    Exercise  - Goal: 150 minutes/week of moderate aerobic activity + 2 strength-training days.  - Start slow: Even daily 10-minute walks help.  - Resistance training (weights or bodyweight) is key to preserve lean muscle mass.    Side Effects  - Common side effects = nausea, constipation, heartburn  - Start with bland, low-fat, low-sugar meals (e.g., toast, boiled rice).   - For constipation: High fiber, water, and possibly OTC remedies (Miralax).   - For nausea: Collette tea, peppermint, and small/frequent meals. Don’t lie down immediately after eating.  - For heartburn (acid reflux): OTC Pepcid, Prilosec, Tums. Don’t lie down immediately after eating.    Mindset  - Practice mindful eating. Stop at fullness, not stuffed.  - Set non-scale goals: Clothing fit, energy, labs, endurance.  - Consider a food or mood journal.  - Work with a dietitian or  health  if needed

## 2025-07-18 NOTE — ASSESSMENT & PLAN NOTE
Will recheck an A1c.  Will see if we can get Zepbound covered to treat obesity, prediabetes, and likely FENG
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
## 2025-07-18 NOTE — ASSESSMENT & PLAN NOTE
Patient is interested in GLP-1 management to treat obesity, prediabetes, and likely FENG.  Will first try for Zepbound.  If this is not covered we will try for Wegovy.  Will also repeat annual blood work to see if his prediabetes has progressed to full-blown type 2 diabetes.  If so, we will try for Mounjaro and then Ozempic.  We have not yet discussed compounding semaglutide as an option.

## 2025-08-19 ENCOUNTER — APPOINTMENT (OUTPATIENT)
Dept: PRIMARY CARE | Facility: CLINIC | Age: 45
End: 2025-08-19
Payer: COMMERCIAL